# Patient Record
Sex: FEMALE | Race: WHITE | NOT HISPANIC OR LATINO | Employment: FULL TIME | URBAN - METROPOLITAN AREA
[De-identification: names, ages, dates, MRNs, and addresses within clinical notes are randomized per-mention and may not be internally consistent; named-entity substitution may affect disease eponyms.]

---

## 2024-10-29 ENCOUNTER — TELEPHONE (OUTPATIENT)
Dept: NEUROSURGERY | Facility: CLINIC | Age: 44
End: 2024-10-29
Payer: COMMERCIAL

## 2024-10-29 NOTE — TELEPHONE ENCOUNTER
Provider:  Power  Surgery/Procedure:  PRINCESS  Surgery/Procedure Date:    Last visit:   7/13/23  Next visit: NA     Reason for call:  Patient called in to report some worsening lower back pain. Her neck and scapular pain has resolved with PT, however in recent months she is having some issues with low back pain that on occasion radiates into her right leg to the knee, and also has some occasional tingling in the right leg. She has tried some conservative measures, core strengthening, weight loss and massage, however she continues to struggle. Ambulation is tough at times, she tries to walk around her block and often times has to stop and call for someone to come pick her up. She is compensating weight to her left leg when the right leg pain flares up.     Since this is a new issue for which our practice has not seen her for, I did recommend she reach out to her PCP first for workup and imaging, and we could then review the imaging to see if there is any role for neurosurgery. She is contemplating seeing a chiropractor first, and if that does not help with her symptoms she will reach out to her PCP and keep us updated.